# Patient Record
Sex: MALE | HISPANIC OR LATINO | Employment: FULL TIME | URBAN - METROPOLITAN AREA
[De-identification: names, ages, dates, MRNs, and addresses within clinical notes are randomized per-mention and may not be internally consistent; named-entity substitution may affect disease eponyms.]

---

## 2021-10-18 ENCOUNTER — APPOINTMENT (OUTPATIENT)
Dept: GENERAL RADIOLOGY | Facility: HOSPITAL | Age: 36
End: 2021-10-18

## 2021-10-18 ENCOUNTER — HOSPITAL ENCOUNTER (EMERGENCY)
Facility: HOSPITAL | Age: 36
Discharge: HOME | End: 2021-10-18
Attending: EMERGENCY MEDICINE | Admitting: EMERGENCY MEDICINE

## 2021-10-18 VITALS
HEIGHT: 68 IN | BODY MASS INDEX: 25.46 KG/M2 | WEIGHT: 168 LBS | DIASTOLIC BLOOD PRESSURE: 95 MMHG | RESPIRATION RATE: 16 BRPM | OXYGEN SATURATION: 97 % | SYSTOLIC BLOOD PRESSURE: 149 MMHG | TEMPERATURE: 98.4 F | HEART RATE: 78 BPM

## 2021-10-18 DIAGNOSIS — S93.401A SPRAIN OF RIGHT ANKLE, UNSPECIFIED LIGAMENT, INITIAL ENCOUNTER: Primary | ICD-10-CM

## 2021-10-18 PROCEDURE — 73590 X-RAY EXAM OF LOWER LEG: CPT

## 2021-10-18 PROCEDURE — 99283 EMERGENCY DEPT VISIT LOW MDM: CPT

## 2021-10-18 PROCEDURE — 73610 X-RAY EXAM OF ANKLE: CPT

## 2021-10-18 NOTE — ED TRIAGE NOTES
Pt jumped off back of truck and injured right ankle    Patient was placed in face mask during first look triage.  Patient was wearing a face mask throughout encounter.  I wore personal protective equipment throughout the encounter.  Hand hygiene was performed before and after patient encounter.

## 2021-10-18 NOTE — ED PROVIDER NOTES
EMERGENCY DEPARTMENT ENCOUNTER    Room Number:  A01/01  Date of encounter:  10/18/2021  PCP: Provider, No Known  Historian: Patient      I used full protective equipment while examining this patient.  This includes face mask, gloves and protective eyewear.  I washed my hands before entering the room and immediately upon leaving the room      HPI:  Chief Complaint: Ankle injury  A complete HPI/ROS/PMH/PSH/SH/FH are unobtainable due to: Nothing    Context: Bandar Walker is a 36 y.o. male who presents to the ED c/o ankle injury just prior to arrival.  Patient states he jumped off the back of a pickup truck for approximately 5 feet.  Patient landed on his right foot and inverted his right ankle.  Patient complains of sudden onset, constant, moderate, throbbing pain.  The pain is localized to the lateral ankle.  There is some radiation up the lateral lower leg as well as the medial malleolus.  Patient denies any proximal fibular pain, or foot pain.  He denies other injuries from the fall.  Denies any prior history of ankle problems.    Review of Medical Records  No previous pertinent medical records found in [facility].    PAST MEDICAL HISTORY  Active Ambulatory Problems     Diagnosis Date Noted   • No Active Ambulatory Problems     Resolved Ambulatory Problems     Diagnosis Date Noted   • No Resolved Ambulatory Problems     No Additional Past Medical History         PAST SURGICAL HISTORY  No past surgical history on file.      FAMILY HISTORY  No family history on file.      SOCIAL HISTORY  Social History     Socioeconomic History   • Marital status: Single         ALLERGIES  Patient has no known allergies.        REVIEW OF SYSTEMS  All systems reviewed and negative except for those discussed in HPI.       PHYSICAL EXAM    I have reviewed the triage vital signs and nursing notes.    ED Triage Vitals   Temp Heart Rate Resp BP SpO2   10/18/21 1048 10/18/21 1048 10/18/21 1048 10/18/21 1124 10/18/21 1048   98.4 °F (36.9 °C) 78 16  149/95 97 %      Temp src Heart Rate Source Patient Position BP Location FiO2 (%)   10/18/21 1048 10/18/21 1048 10/18/21 1124 10/18/21 1124 --   Tympanic Monitor Lying Left arm        Physical Exam  GENERAL: Alert, oriented, not distressed  HENT: head atraumatic, no nuchal rigidity  EYES: no scleral icterus, EOMI  CV: regular rhythm, regular rate, no murmur  RESPIRATORY: normal effort, CTA  ABDOMEN: soft, nontender  MUSCULOSKELETAL: Moderate swelling and mild tenderness to lateral malleolus.  No significant deformity.  Nontender medial malleolus.  Fibular head nontender.  Proximal fifth metatarsal nontender.  NEURO: alert, moves all extremities, follows commands  SKIN: warm, dry      RADIOLOGY  XR Tibia Fibula 2 View Right    Result Date: 10/18/2021  XR TIBIA FIBULA 2 VW RIGHT-  INDICATIONS: Trauma  TECHNIQUE: 2 views of the right lower leg  COMPARISON: Right ankle x-ray from 10/18/2021  FINDINGS:  Abnormal periosteal thickening along the anterior margin of the mid tibia could be in response to prior injury, correlate clinically, bone scan correlation is suggested as indicated to exclude possibility of periosteal neoplasm. No acute fracture, erosion, or dislocation is identified. Soft tissue swelling is conspicuous laterally at the mid to distal right lower leg and ankle.       As described.  Discussed by telephone with Timur Rucker at 1206, 10/18/2021    This report was finalized on 10/18/2021 12:11 PM by Dr. Jason Martinez M.D.      XR Ankle 3+ View Right    Result Date: 10/18/2021  XR ANKLE 3+ VW RIGHT-  Clinical: Injured with pain  FINDINGS: Considerable lateral ankle soft tissue swelling. Tibiotalar alignment is appropriate. No ankle fracture, dislocation or osteochondral defect. The remainder is unremarkable.  This report was finalized on 10/18/2021 11:26 AM by Dr. Luiz Mckeon M.D.        I ordered the above noted radiological studies. Reviewed by me and discussed with radiologist.  See dictation for  official radiology interpretation.    Patient placed in Aircast and educated on crutch training by ER nurse.      PROGRESS, DATA ANALYSIS, CONSULTS, AND MEDICAL DECISION MAKING    All labs have been independently reviewed by me.  All radiology studies have been reviewed by me and discussed with radiologist dictating the report.   EKG's independently viewed and interpreted by me.  Discussion below represents my analysis of pertinent findings related to patient's condition, differential diagnosis, treatment plan and final disposition.    I have discussed case with Dr. De Santiago, emergency room physician.  He has performed his own bedside examination and agrees with treatment plan.    ED Course as of 10/18/21 1718   Mon Oct 18, 2021   1120 Presents with right ankle pain after mechanical fall.  Plan for x-rays and evaluation. [EE]   1135 Right ankle films interpreted myself show no acute fracture.  Significant soft tissue swelling laterally. [EE]   1137 Patient has significant swelling.  Plan to add tib-fib x-rays to further evaluate. [EE]   1152 Right tib-fib films show no acute fracture.  We will place patient in Aircast and fitted for crutches.  Ortho follow-up as needed. [EE]   1211 I discussed x-ray findings with Dr. Martinez.  Patient has a periosteal abnormality to the anterior mid tibia.  This is likely old trauma however he cannot completely rule out neoplasm versus stress fracture.  We will have patient follow-up with orthopedics. [EE]   1217 I updated patient on abnormal x-ray findings.  Patient does note that he hit his right anterior lower leg on a wall several months ago.  I suspect this is likely an old periosteal reaction.  No suspicion of malignancy. [EE]      ED Course User Index  [EE] Timur Rucker PA       AS OF 17:18 EDT VITALS:    BP - 149/95  HR - 78  TEMP - 98.4 °F (36.9 °C) (Tympanic)  O2 SATS - 97%        DIAGNOSIS  Final diagnoses:   Sprain of right ankle, unspecified ligament, initial encounter          DISPOSITION  Discharged           Timur Rucker PA  10/18/21 [id number]